# Patient Record
Sex: FEMALE | Race: WHITE | Employment: OTHER | URBAN - METROPOLITAN AREA
[De-identification: names, ages, dates, MRNs, and addresses within clinical notes are randomized per-mention and may not be internally consistent; named-entity substitution may affect disease eponyms.]

---

## 2017-09-18 ENCOUNTER — OFFICE VISIT (OUTPATIENT)
Dept: FAMILY MEDICINE CLINIC | Facility: CLINIC | Age: 67
End: 2017-09-18

## 2017-09-18 VITALS
OXYGEN SATURATION: 98 % | BODY MASS INDEX: 28 KG/M2 | SYSTOLIC BLOOD PRESSURE: 116 MMHG | RESPIRATION RATE: 16 BRPM | TEMPERATURE: 99 F | HEART RATE: 76 BPM | DIASTOLIC BLOOD PRESSURE: 76 MMHG | WEIGHT: 171 LBS

## 2017-09-18 DIAGNOSIS — J01.00 ACUTE NON-RECURRENT MAXILLARY SINUSITIS: Primary | ICD-10-CM

## 2017-09-18 PROCEDURE — 99213 OFFICE O/P EST LOW 20 MIN: CPT | Performed by: PHYSICIAN ASSISTANT

## 2017-09-18 RX ORDER — AMOXICILLIN AND CLAVULANATE POTASSIUM 875; 125 MG/1; MG/1
1 TABLET, FILM COATED ORAL 2 TIMES DAILY
Qty: 20 TABLET | Refills: 0 | Status: SHIPPED | OUTPATIENT
Start: 2017-09-18 | End: 2017-09-28

## 2017-09-18 RX ORDER — FLUCONAZOLE 150 MG/1
150 TABLET ORAL ONCE
Qty: 1 TABLET | Refills: 0 | Status: SHIPPED | OUTPATIENT
Start: 2017-09-18 | End: 2017-09-18

## 2017-09-18 NOTE — PATIENT INSTRUCTIONS
-Nasal saline spray multiple times per day  -Cool mist humidifier at night  -Go to IC/ER with any worsening symptoms.   If unable to control nosebleeds, please go to IC or ED  -Augmentin for sinus infection  -Diflucan to take as needed  -Ointment for sores · Antibiotic medicine. This is for symptoms that last for at least 10 to 14 days. · Nasal corticosteroid medicine. Drops or spray used in the nose can lessen swelling and congestion. · Over-the-counter pain medicine.  This is to lessen sinus pain and pres

## 2017-09-18 NOTE — PROGRESS NOTES
CHIEF COMPLAINT:   Patient presents with:  Nasal Congestion: 3 weeks. HPI:   Melva Colindres is a 75year old female who presents for URI sxs for  3 weeks. Patient reports green nasal discharge.   Patient denies nausea, vomiting, abdominal pain, feve LUNGS: denies shortness of breath or wheezing, See HPI  CARDIOVASCULAR: denies chest pain or palpitations   GI: denies N/V/C or abdominal pain  NEURO: Denies headaches    EXAM:   /75 (BP Location: Right arm, Patient Position: Sitting, Cuff Size: adul -Patient reports history of nasal ulcer. Patient advised if symptoms worsen or unable to control nose bleed, patient needs to be seen at . If symptoms persist, she needs to follow up with ENT given history.     Meds & Refills for this Visit:    Signed P · Nasal congestion  · Runny nose  · Fluid draining from the nose down the throat (postnasal drip)  · Headache  · Cough  · Pain in the sinuses  · Thick, colored fluid from the nose (mucus)  · Fever  Diagnosing ABRS  ABRS may be diagnosed if you’ve had an up © 5508-7939 The [address] [place] [address], [address] [place]. All rights reserved. This information is not intended as a substitute for professional medical care. Always follow your healthcare professional's instructions.             The

## 2017-11-20 PROBLEM — E55.9 VITAMIN D DEFICIENCY: Status: ACTIVE | Noted: 2017-11-20

## 2018-02-21 PROBLEM — I77.819 DILATATION OF AORTA (HCC): Status: ACTIVE | Noted: 2018-02-21

## 2018-04-09 PROBLEM — J34.2 DEVIATED SEPTUM: Status: ACTIVE | Noted: 2018-04-09

## 2018-04-09 PROBLEM — R09.81 NASAL CONGESTION: Status: ACTIVE | Noted: 2018-04-09

## 2018-07-20 ENCOUNTER — OFFICE VISIT (OUTPATIENT)
Dept: FAMILY MEDICINE CLINIC | Facility: CLINIC | Age: 68
End: 2018-07-20
Payer: COMMERCIAL

## 2018-07-20 VITALS
RESPIRATION RATE: 16 BRPM | TEMPERATURE: 98 F | OXYGEN SATURATION: 98 % | DIASTOLIC BLOOD PRESSURE: 76 MMHG | WEIGHT: 154 LBS | SYSTOLIC BLOOD PRESSURE: 116 MMHG | HEART RATE: 80 BPM | HEIGHT: 64.5 IN | BODY MASS INDEX: 25.97 KG/M2

## 2018-07-20 DIAGNOSIS — Z02.9 ENCOUNTERS FOR ADMINISTRATIVE PURPOSE: Primary | ICD-10-CM

## 2018-07-20 NOTE — PROGRESS NOTES
Patient presents with complaints of LLQ pain x1 week. Denies N/V/C/D, fever, flank pain, and urinary symptoms. Rates pain as a 1/10 but states pain is severe when area is touched. She has taken aspirin which helps pain.   Also reports had an episode of v

## 2018-11-19 PROBLEM — J01.80 OTHER SUBACUTE SINUSITIS: Status: ACTIVE | Noted: 2018-11-19

## 2019-05-07 PROBLEM — R09.81 NASAL CONGESTION: Status: RESOLVED | Noted: 2018-04-09 | Resolved: 2019-05-07

## 2019-05-07 PROBLEM — J01.80 OTHER SUBACUTE SINUSITIS: Status: RESOLVED | Noted: 2018-11-19 | Resolved: 2019-05-07

## 2021-05-07 PROBLEM — J34.2 DEVIATED SEPTUM: Status: RESOLVED | Noted: 2018-04-09 | Resolved: 2021-05-07

## 2021-07-22 PROBLEM — H81.10 BENIGN PAROXYSMAL POSITIONAL VERTIGO, UNSPECIFIED LATERALITY: Status: ACTIVE | Noted: 2021-07-22

## 2021-07-22 PROBLEM — R42 DIZZINESS: Status: ACTIVE | Noted: 2021-07-22

## 2021-10-28 ENCOUNTER — HOSPITAL ENCOUNTER (EMERGENCY)
Age: 71
Discharge: HOME | End: 2021-10-28
Attending: EMERGENCY MEDICINE
Payer: MEDICARE

## 2021-10-28 VITALS
HEIGHT: 65 IN | BODY MASS INDEX: 26.66 KG/M2 | WEIGHT: 160 LBS | RESPIRATION RATE: 18 BRPM | TEMPERATURE: 98 F | HEART RATE: 66 BPM | DIASTOLIC BLOOD PRESSURE: 73 MMHG | SYSTOLIC BLOOD PRESSURE: 155 MMHG | OXYGEN SATURATION: 98 %

## 2021-10-28 DIAGNOSIS — H53.9 VISUAL DISTURBANCE: Primary | ICD-10-CM

## 2021-10-28 PROCEDURE — 99283 EMERGENCY DEPT VISIT LOW MDM: CPT

## 2021-10-29 NOTE — ED PROVIDER NOTES
/  Patient Seen in: THE [facility] Emergency Department In [place]      History   Patient presents with:   Eye Visual Problem    Stated Complaint: flashing lights lt eye and \"something over my eye\" x 3 days, states she had a s*    Subjective:   HPI    71-year- s*  Other systems are as noted in HPI. Constitutional and vital signs reviewed. All other systems reviewed and negative except as noted above.     Physical Exam     ED Triage Vitals [10/28/21 1907]   BP (!) 183/61   Pulse 73   Resp 14   Temp 98.1 °F ( provided with phone number and instructed to call first thing the morning to arrange appointment. I did attempt to call patient's ophthalmologist at the [address] in [place], they do not have anyone on-call for the group today.   Patient also facundo

## 2021-11-15 NOTE — PROGRESS NOTES
Patient is here for consult. Pt has hx of CVA on approx 9-19-21. She felt left arm heaviness and weakness which did not improve. She was found to have left internal carotid artery stenosis. She is on aspirin and Lipitor. Last MRI was on 9-24-21.     Eric

## 2021-11-16 ENCOUNTER — OFFICE VISIT (OUTPATIENT)
Dept: SURGERY | Facility: CLINIC | Age: 71
End: 2021-11-16
Payer: COMMERCIAL

## 2021-11-16 VITALS — HEART RATE: 64 BPM | DIASTOLIC BLOOD PRESSURE: 74 MMHG | SYSTOLIC BLOOD PRESSURE: 112 MMHG

## 2021-11-16 DIAGNOSIS — I63.512 ACUTE ISCHEMIC LEFT MCA STROKE (HCC): Primary | ICD-10-CM

## 2021-11-16 PROCEDURE — 99204 OFFICE O/P NEW MOD 45 MIN: CPT

## 2021-11-16 PROCEDURE — 3078F DIAST BP <80 MM HG: CPT

## 2021-11-16 PROCEDURE — 3074F SYST BP LT 130 MM HG: CPT

## 2021-11-16 NOTE — PROGRESS NOTES
Review of Systems:    Hand Dominance: right  General: no symptoms reported  Neuro: no symptoms reported  Head: no symptoms reported  Musculoskeletal: joint pain and no symptoms reported  Cardiovascular: no symptoms reported  Gastrointestinal: heartburn  Ge

## 2021-11-16 NOTE — PROGRESS NOTES
[facility]  Interventional Neuroradiology Clinic Note      Niesha Rubio MD   Neurology    Sarah Capone MD  Internal Medicine    Graciela Kay MD  Cardiology      Date of Service: 11/16/2021    Dear Ray Jerry,     We had the pleasure of seeing extremity weakness or paresthesias. The patient denies dizziness, imbalance, falls, difficulty with ambulation, coordination, dysarthria, or speech expression/comprehension.      Past Medical/Surgical History:  Past Medical History:   Diagnosis Date   • Ivanna Miranda and cooperative throughout the examination. HEENT/NEURO:  The patient's head is normocephalic with anicteric sclerae, moist mucous membranes, and nasal cavities. There is no evidence of carotid bruits, lymphadenopathy, or masses.  The thyroid is midline steno-occlusive vasculopathy/[place], or subocclusive thromboembolus.   Furthermore, cerebral blood flow measurements would assess if this is a hemodynamically significant stenosis that could optimize her medical management and systolic blood pressure goal aspirin and statin medications may harbor inherently anti-inflammatory properties to stabilize and even regress atherosclerotic plaques.  Additionally, we counseled the patient on lifestyle modification with low calorie, plant-based or Mediterranean diet an

## 2021-11-19 ENCOUNTER — TELEPHONE (OUTPATIENT)
Dept: NEUROLOGY | Facility: CLINIC | Age: 71
End: 2021-11-19

## 2021-11-19 DIAGNOSIS — I65.29 INTRACRANIAL CAROTID STENOSIS, UNSPECIFIED LATERALITY: Primary | ICD-10-CM

## 2021-11-19 NOTE — TELEPHONE ENCOUNTER
Pt was recently seen with Dr. Fina Granados and would like to start on the Plavix prescription as requested by Dr. Fina Granados. Pt is also questioning if she should be taking a baby aspirin. Please advise.

## 2021-11-19 NOTE — TELEPHONE ENCOUNTER
Per last OV note on 11-16-21 \"We counseled the patient towards risk factor management and lifestyle modification and will recommend adding clopidogrel 75 mg antiplatelet therapy to aspirin 325 mg therapy for 3-6 months as per the SAMMPRIS criteria\"    Me

## 2021-11-22 RX ORDER — ATORVASTATIN CALCIUM 80 MG/1
80 TABLET, FILM COATED ORAL NIGHTLY
Qty: 30 TABLET | Refills: 3 | Status: SHIPPED | OUTPATIENT
Start: 2021-11-22

## 2021-11-22 RX ORDER — CLOPIDOGREL BISULFATE 75 MG/1
75 TABLET ORAL DAILY
Qty: 30 TABLET | Refills: 3 | Status: SHIPPED | OUTPATIENT
Start: 2021-11-22

## 2021-11-22 RX ORDER — ASPIRIN 325 MG
325 TABLET ORAL DAILY
Qty: 30 TABLET | Refills: 3 | Status: SHIPPED | OUTPATIENT
Start: 2021-11-22

## 2021-11-22 NOTE — TELEPHONE ENCOUNTER
Spoke with Dr. Bernadette Valle regarding all of the below. Patient states she has decided she would like to follow up with our [facility] services. I explained that I would then prescribe the ASA and plavix.  Per the request of the patient, I will also increased her statin

## 2021-11-22 NOTE — TELEPHONE ENCOUNTER
Pt calling to find out if Dr. Tosha Klein wants her to get her imaging disc from [facility] and if he is going to order her an MRA. Pt would also like to speak to someone about prescribing medication for her. Please call to advise.

## 2021-11-23 DIAGNOSIS — I67.9 INTRACRANIAL VASCULAR STENOSIS: Primary | ICD-10-CM

## 2021-11-29 ENCOUNTER — TELEPHONE (OUTPATIENT)
Dept: PAIN CLINIC | Facility: CLINIC | Age: 71
End: 2021-11-29

## 2021-11-29 NOTE — TELEPHONE ENCOUNTER
Pt dropped off imaging disc and report. Pt requested disc be returned via mail. Copies of report have been placed in RN bin for MD review. Originals sent to scanning. Disc has been uploaded to PACS system.

## 2021-12-15 ENCOUNTER — HOSPITAL ENCOUNTER (OUTPATIENT)
Dept: MRI IMAGING | Facility: HOSPITAL | Age: 71
Discharge: HOME | End: 2021-12-15
Payer: MEDICARE

## 2021-12-15 DIAGNOSIS — I67.9 INTRACRANIAL VASCULAR STENOSIS: ICD-10-CM

## 2021-12-15 DIAGNOSIS — I65.29 INTRACRANIAL CAROTID STENOSIS, UNSPECIFIED LATERALITY: ICD-10-CM

## 2021-12-15 PROCEDURE — 70553 MRI BRAIN STEM W/O & W/DYE: CPT

## 2021-12-15 PROCEDURE — A9575 INJ GADOTERATE MEGLUMI 0.1ML: HCPCS

## 2021-12-15 PROCEDURE — 70546 MR ANGIOGRAPH HEAD W/O&W/DYE: CPT

## 2022-01-04 ENCOUNTER — OFFICE VISIT (OUTPATIENT)
Dept: SURGERY | Facility: CLINIC | Age: 72
End: 2022-01-04
Payer: COMMERCIAL

## 2022-01-04 VITALS
BODY MASS INDEX: 26.66 KG/M2 | WEIGHT: 160 LBS | DIASTOLIC BLOOD PRESSURE: 76 MMHG | HEIGHT: 65 IN | SYSTOLIC BLOOD PRESSURE: 132 MMHG

## 2022-01-04 DIAGNOSIS — I67.2 CEREBRAL ATHEROSCLEROSIS: Primary | ICD-10-CM

## 2022-01-04 DIAGNOSIS — E78.2 MIXED HYPERLIPIDEMIA: ICD-10-CM

## 2022-01-04 PROCEDURE — 99214 OFFICE O/P EST MOD 30 MIN: CPT

## 2022-01-04 PROCEDURE — 3008F BODY MASS INDEX DOCD: CPT

## 2022-01-04 PROCEDURE — 3078F DIAST BP <80 MM HG: CPT

## 2022-01-04 PROCEDURE — 3075F SYST BP GE 130 - 139MM HG: CPT

## 2022-01-04 NOTE — PROGRESS NOTES
Patient is here for 3 month follow up. Since her last visit with [facility] she had an MRI brain and MRA head completed and is here to discuss the results. Imaging from [address] [place] [address] is uploaded to PACS. She is doing well.       MRS-0  Barthel- 100    Review of Systems:

## 2022-01-04 NOTE — PROGRESS NOTES
[facility]  Interventional Neuroradiology Clinic Note        Cris Morgan MD  Primary Care      Date of Service: 01/04/2022    Dear Min Galicia,     We had the pleasure of seeing Lorraine Tyler in the Interventional Neuroradiology Clinic at THE [facility] patient denies dizziness, imbalance, falls, difficulty with ambulation, coordination, dysarthria, or speech expression/comprehension.      The patient denies constitutional symptoms, such as fevers, chills, night sweats, weight loss, chest pain, shortness o aspirin 325 MG Oral Tab, Take 325 mg by mouth daily. , Disp: , Rfl:     Allergies:     Sulfa Antibiotics       NAUSEA AND VOMITING  Ciprofloxacin           OTHER (SEE COMMENTS)    Comment:Foot pain  Levaquin                  Quinolones              PAIN internal carotid artery with recruitment of leptomeningeal collaterals. There is no relative hypoperfusion on the cerebral blood volume maps to suggest acute ischemia or infarction.   MRA Brain 12/15/21  Redemonstration of high-grade stenosis in the distal modification and will recommend adding clopidogrel 75 mg antiplatelet therapy to aspirin 325 mg therapy for 3-6 months as per the SAMMPRIS criteria.  We recommended regular risk factor monitoring for hypertension, diabetes mellitus, and hyperlipidemia by he

## 2022-01-04 NOTE — PATIENT INSTRUCTIONS
Return to clinic to see Dr. Farrah Brunner in 3 months. At that time we will consider stopping or continuing the Plavix (clopidogrel)    Continue taking the Aspirin, Plavix and Atorvastatin. Follow-up with stroke Neurology.  Make an appointment with Dr. Sara Luz PRESCRIPTIONS MUST BE PICKED UP PRIOR TO 3:00PM MONDAY-FRIDAY    Scheduling Tests:     If your physician has ordered radiology tests such as MRI or CT scans, do not schedule the test until this office has notified you that the test has been approved by your submitted. When dropping off forms, please ask the  for this paper. • Failure to follow above steps may result in the delay of form completion.

## 2022-02-11 PROBLEM — R42 DIZZINESS: Status: RESOLVED | Noted: 2021-07-22 | Resolved: 2022-02-11

## 2022-02-11 PROBLEM — Z86.73 HISTORY OF STROKE: Status: ACTIVE | Noted: 2022-02-11

## 2022-02-11 PROBLEM — I47.1 PSVT (PAROXYSMAL SUPRAVENTRICULAR TACHYCARDIA) (HCC): Status: ACTIVE | Noted: 2022-02-11

## 2022-02-11 PROBLEM — I47.10 PSVT (PAROXYSMAL SUPRAVENTRICULAR TACHYCARDIA) (HCC): Status: ACTIVE | Noted: 2022-02-11

## 2022-02-11 PROBLEM — G31.9 BRAIN ATROPHY (HCC): Status: ACTIVE | Noted: 2022-02-11

## 2022-04-04 NOTE — PROGRESS NOTES
Patient is here for 3 month follow up. Dangelo had a stroke in September 2021. She has hx of severe stenosis of the distal portion of the left cavernous internal carotid artery. She was diagnosed with intracranial atherosclerosis. She is on atorvastatin, Plavix and aspirin. She has been doing well. She gets lightheaded when standing up too fast which makes her feel weak but this happens infrequently.      Barthel-100  MRS-1      Review of Systems:    Hand Dominance: right  General: no symptoms reported  Neuro: no symptoms reported  Head: dizziness/spinning  Musculoskeletal: numbness and tingling one toe  Cardiovascular: no symptoms reported  Gastrointestinal: no symptoms reported  Genitourinary: no symptoms reported  Respiratory: no symptoms reported  Eyes: no symptoms reported hx of vitreous detachment   Skin: no symptoms reported  Mouth & throat: no symptoms reported  Neck: no symptoms reported  Nose: no symptoms reported  Psychiatric: no symptoms reported

## 2022-04-05 ENCOUNTER — OFFICE VISIT (OUTPATIENT)
Dept: SURGERY | Facility: CLINIC | Age: 72
End: 2022-04-05
Payer: COMMERCIAL

## 2022-04-05 VITALS
DIASTOLIC BLOOD PRESSURE: 84 MMHG | WEIGHT: 157 LBS | BODY MASS INDEX: 26.16 KG/M2 | SYSTOLIC BLOOD PRESSURE: 130 MMHG | HEIGHT: 65 IN

## 2022-04-05 DIAGNOSIS — I67.2 CEREBRAL ATHEROSCLEROSIS: Primary | ICD-10-CM

## 2022-04-05 PROCEDURE — 99214 OFFICE O/P EST MOD 30 MIN: CPT

## 2022-04-05 PROCEDURE — 3079F DIAST BP 80-89 MM HG: CPT

## 2022-04-05 PROCEDURE — 3008F BODY MASS INDEX DOCD: CPT

## 2022-04-05 PROCEDURE — 3075F SYST BP GE 130 - 139MM HG: CPT

## 2022-04-05 RX ORDER — MULTIVIT-MIN/IRON FUM/FOLIC AC 7.5 MG-4
1 TABLET ORAL DAILY
COMMUNITY

## 2023-01-04 ENCOUNTER — TELEPHONE (OUTPATIENT)
Dept: SURGERY | Facility: CLINIC | Age: 73
End: 2023-01-04

## 2023-01-04 NOTE — TELEPHONE ENCOUNTER
Noted PSR Message from pt listed below     Noted pt is currently taking   - Aspirin 325mg Daily   - Clopidogrel 75mg Daily     Routed to Edelmiro Lanes, APRN for review and feedback

## 2023-01-04 NOTE — TELEPHONE ENCOUNTER
Pt is currently on blood thinners and is scheduled for endoscopy and due for colonoscopy and would like to discuss blood thinner instructions stated Dr. Renata Carter started her on blood thinners please advise

## 2023-01-09 NOTE — TELEPHONE ENCOUNTER
Noted that patient has been called by VON Christopher and which provider should be managing her medications was discussed. Patient to call MIKE back if she would like instructions on ASA and Plavix prior to endoscopy after reaching out to [facility] provider.

## 2023-01-09 NOTE — TELEPHONE ENCOUNTER
Called and spoke with patient. Asked patient whether she intends on following with us long term. As previously noted, she stated that she would be following with a neurologist and potential [facility] group that was closer to her home [facility]). She stated that since our office visit, she has also reached out to a MD at [facility] and had a \"Zoom\" consultation. She states that she was told at that visit that she does not need follow up with any neurologist and that her primary care can reorder her blood thinners and cholesterol medications. I told the patient that if she would like to continue following with our [facility] practice going forward, then I would reach out to Dr. Frances Lopez to get his recommendations for when/if she can be off her DAPT prior to any elective procedures. She states that she will call [facility] and discuss with them. She was instructed to reach back out to discuss, if she decides that she would like to proceed with our services and obtain our professional recommendations.

## 2024-05-26 ENCOUNTER — HOSPITAL ENCOUNTER (EMERGENCY)
Age: 74
Discharge: HOME | End: 2024-05-26
Attending: EMERGENCY MEDICINE
Payer: MEDICARE

## 2024-05-26 ENCOUNTER — APPOINTMENT (OUTPATIENT)
Dept: GENERAL RADIOLOGY | Age: 74
End: 2024-05-26
Attending: EMERGENCY MEDICINE
Payer: MEDICARE

## 2024-05-26 ENCOUNTER — APPOINTMENT (OUTPATIENT)
Dept: CT IMAGING | Age: 74
End: 2024-05-26
Attending: EMERGENCY MEDICINE
Payer: MEDICARE

## 2024-05-26 VITALS
OXYGEN SATURATION: 98 % | DIASTOLIC BLOOD PRESSURE: 66 MMHG | TEMPERATURE: 99 F | SYSTOLIC BLOOD PRESSURE: 154 MMHG | RESPIRATION RATE: 16 BRPM | WEIGHT: 158 LBS | HEIGHT: 65 IN | HEART RATE: 71 BPM | BODY MASS INDEX: 26.33 KG/M2

## 2024-05-26 DIAGNOSIS — M54.9 MID BACK PAIN ON RIGHT SIDE: Primary | ICD-10-CM

## 2024-05-26 LAB
ANION GAP SERPL CALC-SCNC: 6 MMOL/L (ref 0–18)
BASOPHILS # BLD AUTO: 0.05 X10(3) UL (ref 0–0.2)
BASOPHILS NFR BLD AUTO: 0.7 %
BILIRUB UR QL STRIP.AUTO: NEGATIVE
BUN BLD-MCNC: 14 MG/DL (ref 9–23)
CALCIUM BLD-MCNC: 9.2 MG/DL (ref 8.5–10.1)
CHLORIDE SERPL-SCNC: 109 MMOL/L (ref 98–112)
CLARITY UR REFRACT.AUTO: CLEAR
CO2 SERPL-SCNC: 26 MMOL/L (ref 21–32)
COLOR UR AUTO: YELLOW
CREAT BLD-MCNC: 0.83 MG/DL
EGFRCR SERPLBLD CKD-EPI 2021: 73 ML/MIN/1.73M2 (ref 60–?)
EOSINOPHIL # BLD AUTO: 0.13 X10(3) UL (ref 0–0.7)
EOSINOPHIL NFR BLD AUTO: 1.7 %
ERYTHROCYTE [DISTWIDTH] IN BLOOD BY AUTOMATED COUNT: 13.1 %
GLUCOSE BLD-MCNC: 111 MG/DL (ref 70–99)
GLUCOSE UR STRIP.AUTO-MCNC: NEGATIVE MG/DL
HCT VFR BLD AUTO: 40 %
HGB BLD-MCNC: 13.8 G/DL
IMM GRANULOCYTES # BLD AUTO: 0.01 X10(3) UL (ref 0–1)
IMM GRANULOCYTES NFR BLD: 0.1 %
KETONES UR STRIP.AUTO-MCNC: NEGATIVE MG/DL
LEUKOCYTE ESTERASE UR QL STRIP.AUTO: NEGATIVE
LYMPHOCYTES # BLD AUTO: 2.33 X10(3) UL (ref 1–4)
LYMPHOCYTES NFR BLD AUTO: 30.5 %
MCH RBC QN AUTO: 32.8 PG (ref 26–34)
MCHC RBC AUTO-ENTMCNC: 34.5 G/DL (ref 31–37)
MCV RBC AUTO: 95 FL
MONOCYTES # BLD AUTO: 0.72 X10(3) UL (ref 0.1–1)
MONOCYTES NFR BLD AUTO: 9.4 %
NEUTROPHILS # BLD AUTO: 4.39 X10 (3) UL (ref 1.5–7.7)
NEUTROPHILS # BLD AUTO: 4.39 X10(3) UL (ref 1.5–7.7)
NEUTROPHILS NFR BLD AUTO: 57.6 %
NITRITE UR QL STRIP.AUTO: NEGATIVE
OSMOLALITY SERPL CALC.SUM OF ELEC: 293 MOSM/KG (ref 275–295)
PH UR STRIP.AUTO: 5.5 [PH] (ref 5–8)
PLATELET # BLD AUTO: 227 10(3)UL (ref 150–450)
POTASSIUM SERPL-SCNC: 3.5 MMOL/L (ref 3.5–5.1)
PROT UR STRIP.AUTO-MCNC: NEGATIVE MG/DL
RBC # BLD AUTO: 4.21 X10(6)UL
SODIUM SERPL-SCNC: 141 MMOL/L (ref 136–145)
SP GR UR STRIP.AUTO: 1.01 (ref 1–1.03)
UROBILINOGEN UR STRIP.AUTO-MCNC: 0.2 MG/DL
WBC # BLD AUTO: 7.6 X10(3) UL (ref 4–11)

## 2024-05-26 PROCEDURE — 74176 CT ABD & PELVIS W/O CONTRAST: CPT | Performed by: EMERGENCY MEDICINE

## 2024-05-26 PROCEDURE — 81001 URINALYSIS AUTO W/SCOPE: CPT | Performed by: EMERGENCY MEDICINE

## 2024-05-26 PROCEDURE — 71046 X-RAY EXAM CHEST 2 VIEWS: CPT | Performed by: EMERGENCY MEDICINE

## 2024-05-26 PROCEDURE — 85025 COMPLETE CBC W/AUTO DIFF WBC: CPT | Performed by: EMERGENCY MEDICINE

## 2024-05-26 PROCEDURE — 99284 EMERGENCY DEPT VISIT MOD MDM: CPT

## 2024-05-26 PROCEDURE — 81015 MICROSCOPIC EXAM OF URINE: CPT | Performed by: EMERGENCY MEDICINE

## 2024-05-26 PROCEDURE — 36415 COLL VENOUS BLD VENIPUNCTURE: CPT

## 2024-05-26 PROCEDURE — 80048 BASIC METABOLIC PNL TOTAL CA: CPT | Performed by: EMERGENCY MEDICINE

## 2024-05-26 RX ORDER — DEXLANSOPRAZOLE 60 MG/1
60 CAPSULE, DELAYED RELEASE ORAL DAILY
COMMUNITY
Start: 2023-06-01

## 2024-05-26 RX ORDER — LIDOCAINE 50 MG/G
1 PATCH TOPICAL EVERY 24 HOURS
Qty: 30 EACH | Refills: 0 | Status: SHIPPED | OUTPATIENT
Start: 2024-05-26

## 2024-05-26 RX ORDER — AZATHIOPRINE 50 MG/1
100 TABLET ORAL DAILY
COMMUNITY
Start: 2023-04-05

## 2024-05-26 RX ORDER — LIFITEGRAST 50 MG/ML
1 SOLUTION/ DROPS OPHTHALMIC 2 TIMES DAILY
COMMUNITY
Start: 2022-12-05

## 2024-05-26 NOTE — ED INITIAL ASSESSMENT (HPI)
PT to the ED for evaluation of R flank pain. Pt concerned for possible kidney infection. PT reports she just had a negative UA and culture Thursday, but when the flank pain started overnight she became concerned for kidney infection. Denies fever.

## 2024-05-26 NOTE — ED PROVIDER NOTES
Patient Seen in: [facility] In [place]      History     Chief Complaint   Patient presents with    Abdomen/Flank Pain     Stated Complaint: R flank pain    Subjective:   HPI    75 yo fm with pmh of CVA on plavix, cirrhosis with c/o R flank pain. Reports had similar symptoms this past week.  Patient reports gross sensation in the right flank region when she urinates.  Reports that tonight she woke up to use the restroom when she noticed this right-sided flank pain.  She is concerned that she might have a kidney infection.  She reports that she was tested for UTI this past week and it was negative along with a culture which she received results was also negative.  Denies any fever or chills.  No nausea no vomiting no neri pain.  Mild abdominal bloating.  No medications taken prior to arrival.    Objective:   Past Medical History:    Acute cerebrovascular accident (CVA) due to embolism of left middle cerebral artery (HCC)    Acute ischemic left MCA stroke (HCC)    Autoimmune hepatitis (HCC)    Brain atrophy (HCC)    Cerebral atherosclerosis    Cirrhosis (HCC)    Dilation of aorta (HCC)    Diverticulitis    Esophageal reflux    Inflammatory arthritis    Intracranial atherosclerosis    Intracranial vascular stenosis    Neuropathy    bilat foot    Osteopenia    PSVT (paroxysmal supraventricular tachycardia) (HCC)    Stroke (HCC)              Past Surgical History:   Procedure Laterality Date          Colonoscopy  2021    [place] Dr. Sivakumar Cueto    [facility]    Removal gallbladder      Tonsillectomy      Total abdom hysterectomy                  Social History     Socioeconomic History    Marital status:    Tobacco Use    Smoking status: Never     Passive exposure: Never    Smokeless tobacco: Never   Vaping Use    Vaping status: Never Used   Substance and Sexual Activity    Alcohol use: No     Alcohol/week: 0.0 standard drinks of alcohol     Drug use: No     Social Determinants of Health      Received from [facility]    Housing Stability              Review of Systems    Positive for stated complaint: R flank pain  Other systems are as noted in HPI.  Constitutional and vital signs reviewed.      All other systems reviewed and negative except as noted above.    Physical Exam     ED Triage Vitals [05/26/24 0506]   /66   Pulse 76   Resp 16   Temp 99.3 °F (37.4 °C)   Temp src Oral   SpO2 98 %   O2 Device None (Room air)       Current Vitals:   Vital Signs  BP: 154/66  Pulse: 76  Resp: 16  Temp: 99.3 °F (37.4 °C)  Temp src: Oral    Oxygen Therapy  SpO2: 98 %  O2 Device: None (Room air)            Physical Exam  Vitals and nursing note reviewed.   Constitutional:       Appearance: She is well-developed.   HENT:      Head: Normocephalic and atraumatic.   Eyes:      Pupils: Pupils are equal, round, and reactive to light.   Cardiovascular:      Rate and Rhythm: Normal rate and regular rhythm.   Pulmonary:      Effort: Pulmonary effort is normal.      Breath sounds: Normal breath sounds.   Abdominal:      General: Bowel sounds are normal.      Palpations: Abdomen is soft.   Musculoskeletal:         General: No deformity.        Arms:       Cervical back: Normal range of motion and neck supple.      Comments: No midline CTL spine tenderness with patient step-offs deformities right paraspinal muscular tenderness in the right mid to lower thoracic region no overlying rashes crepitus or bony deformities   Skin:     General: Skin is warm and dry.      Capillary Refill: Capillary refill takes less than 2 seconds.   Neurological:      Mental Status: She is alert and oriented to person, place, and time.               ED Course     Labs Reviewed   URINALYSIS WITH CULTURE REFLEX - Abnormal; Notable for the following components:       Result Value    Blood Urine Trace-Intact (*)     Squamous Epi. Cells Few (*)     Renal Tubular Epithelial Cells  Few (*)     All other components within normal limits   UA MICROSCOPIC ONLY, URINE - Abnormal; Notable for the following components:    Squamous Epi. Cells Few (*)     Renal Tubular Epithelial Cells Few (*)     All other components within normal limits   BASIC METABOLIC PANEL (8) - Abnormal; Notable for the following components:    Glucose 111 (*)     All other components within normal limits   CBC WITH DIFFERENTIAL WITH PLATELET    Narrative:     The following orders were created for panel order CBC With Differential With Platelet.  Procedure                               Abnormality         Status                     ---------                               -----------         ------                     CBC W/ DIFFERENTIAL[[id number]]                              Final result                 Please view results for these tests on the individual orders.   CBC W/ DIFFERENTIAL                          MDM      This 74-year-old female presents ED with complaints of right-sided back pain.  Vital signs stable arrival patient appears nontoxic lamination no CVA tenderness palpation however tenderness elevation overlying the right-sided paraspinal muscular tenderness in mid thoracic to lower thoracic region without any gross deformities or crepitus.  Urinalysis with trace blood.  CBC and electrolytes gross within normal limits.  Will obtain CT abdomen pelvis rule out cholelithiasis versus ureterolithiasis. S/o to Dr. Lucia to f/u on CT abd pelvis results.                                    Medical Decision Making      Disposition and Plan     Clinical Impression:  1. Mid back pain on right side         Disposition:  There is no disposition on file for this visit.  There is no disposition time on file for this visit.    Follow-up:  Denzel Barrow MD  [address] [place] [address] [id number] [phone]    Call in 1 day(s)            Medications Prescribed:  Current Discharge Medication List        START taking these  medications    Details   lidocaine 5 % External Patch Place 1 patch onto the skin daily.  Qty: 30 each, Refills: 0    Associated Diagnoses: Mid back pain on right side

## 2024-05-26 NOTE — DISCHARGE INSTRUCTIONS
Please follow-up with your primary care physician 1-2 days return to the ER if your symptoms worsen progress or if you have any further concerns.  Please alternate acetaminophen 650 mg with ibuprofen 600 mg every 3-4 hours as needed for pain control.  Please apply lidocaine patches as prescribed as needed for back pain